# Patient Record
Sex: FEMALE | Race: BLACK OR AFRICAN AMERICAN | NOT HISPANIC OR LATINO | Employment: OTHER | URBAN - METROPOLITAN AREA
[De-identification: names, ages, dates, MRNs, and addresses within clinical notes are randomized per-mention and may not be internally consistent; named-entity substitution may affect disease eponyms.]

---

## 2022-05-26 ENCOUNTER — HOSPITAL ENCOUNTER (EMERGENCY)
Facility: HOSPITAL | Age: 42
Discharge: HOME | End: 2022-05-26
Attending: STUDENT IN AN ORGANIZED HEALTH CARE EDUCATION/TRAINING PROGRAM
Payer: MEDICAID

## 2022-05-26 VITALS
SYSTOLIC BLOOD PRESSURE: 104 MMHG | HEART RATE: 86 BPM | BODY MASS INDEX: 24.8 KG/M2 | DIASTOLIC BLOOD PRESSURE: 61 MMHG | RESPIRATION RATE: 20 BRPM | HEIGHT: 67 IN | WEIGHT: 158 LBS | OXYGEN SATURATION: 98 % | TEMPERATURE: 99 F

## 2022-05-26 DIAGNOSIS — E86.0 DEHYDRATION: ICD-10-CM

## 2022-05-26 DIAGNOSIS — R55 SYNCOPE: ICD-10-CM

## 2022-05-26 DIAGNOSIS — R40.20 LOC (LOSS OF CONSCIOUSNESS): Primary | ICD-10-CM

## 2022-05-26 LAB
ALBUMIN SERPL-MCNC: 4.1 GM/DL (ref 3.5–5)
ALBUMIN/GLOB SERPL: 1.4 RATIO (ref 1.1–2)
ALP SERPL-CCNC: 69 UNIT/L (ref 40–150)
ALT SERPL-CCNC: 14 UNIT/L (ref 0–55)
ANISOCYTOSIS BLD QL SMEAR: ABNORMAL
APPEARANCE UR: CLEAR
AST SERPL-CCNC: 19 UNIT/L (ref 5–34)
BACTERIA #/AREA URNS AUTO: NORMAL /HPF
BASOPHILS # BLD AUTO: 0.08 X10(3)/MCL (ref 0–0.2)
BASOPHILS NFR BLD AUTO: 0.8 %
BILIRUB UR QL STRIP.AUTO: NEGATIVE MG/DL
BILIRUBIN DIRECT+TOT PNL SERPL-MCNC: 0.5 MG/DL
BUN SERPL-MCNC: 12 MG/DL (ref 7–18.7)
CALCIUM SERPL-MCNC: 9.5 MG/DL (ref 8.4–10.2)
CHLORIDE SERPL-SCNC: 107 MMOL/L (ref 98–107)
CO2 SERPL-SCNC: 24 MMOL/L (ref 22–29)
COLOR UR AUTO: YELLOW
CREAT SERPL-MCNC: 0.97 MG/DL (ref 0.55–1.02)
EOSINOPHIL # BLD AUTO: 0.18 X10(3)/MCL (ref 0–0.9)
EOSINOPHIL NFR BLD AUTO: 1.8 %
ERYTHROCYTE [DISTWIDTH] IN BLOOD BY AUTOMATED COUNT: 19.5 % (ref 11.5–17)
GLOBULIN SER-MCNC: 3 GM/DL (ref 2.4–3.5)
GLUCOSE SERPL-MCNC: 108 MG/DL (ref 74–100)
GLUCOSE UR QL STRIP.AUTO: NEGATIVE MG/DL
HCT VFR BLD AUTO: 29.1 % (ref 37–47)
HGB BLD-MCNC: 8.4 GM/DL (ref 12–16)
IMM GRANULOCYTES # BLD AUTO: 0.07 X10(3)/MCL (ref 0–0.02)
IMM GRANULOCYTES NFR BLD AUTO: 0.7 % (ref 0–0.43)
KETONES UR QL STRIP.AUTO: ABNORMAL MG/DL
LEUKOCYTE ESTERASE UR QL STRIP.AUTO: NEGATIVE UNIT/L
LYMPHOCYTES # BLD AUTO: 2.11 X10(3)/MCL (ref 0.6–4.6)
LYMPHOCYTES NFR BLD AUTO: 20.6 %
MACROCYTES BLD QL SMEAR: ABNORMAL
MCH RBC QN AUTO: 23.7 PG (ref 27–31)
MCHC RBC AUTO-ENTMCNC: 28.9 MG/DL (ref 33–36)
MCV RBC AUTO: 82 FL (ref 80–94)
MONOCYTES # BLD AUTO: 0.77 X10(3)/MCL (ref 0.1–1.3)
MONOCYTES NFR BLD AUTO: 7.5 %
NEUTROPHILS # BLD AUTO: 7.1 X10(3)/MCL (ref 2.1–9.2)
NEUTROPHILS NFR BLD AUTO: 68.6 %
NITRITE UR QL STRIP.AUTO: NEGATIVE
NRBC BLD AUTO-RTO: 0 %
PH UR STRIP.AUTO: 5.5 [PH]
PLATELET # BLD AUTO: 531 X10(3)/MCL (ref 130–400)
PLATELET # BLD EST: ABNORMAL 10*3/UL
PMV BLD AUTO: 8.7 FL (ref 9.4–12.4)
POCT GLUCOSE: 121 MG/DL (ref 70–110)
POTASSIUM SERPL-SCNC: 4 MMOL/L (ref 3.5–5.1)
PROT SERPL-MCNC: 7.1 GM/DL (ref 6.4–8.3)
PROT UR QL STRIP.AUTO: NEGATIVE MG/DL
RBC # BLD AUTO: 3.55 X10(6)/MCL (ref 4.2–5.4)
RBC #/AREA URNS AUTO: <5 /HPF
RBC UR QL AUTO: NEGATIVE UNIT/L
SODIUM SERPL-SCNC: 139 MMOL/L (ref 136–145)
SP GR UR STRIP.AUTO: 1.01 (ref 1–1.03)
SQUAMOUS #/AREA URNS AUTO: <4 /LPF
TROPONIN I SERPL-MCNC: <0.01 NG/ML (ref 0–0.04)
UROBILINOGEN UR STRIP-ACNC: 0.2 MG/DL
WBC # SPEC AUTO: 10.3 X10(3)/MCL (ref 4.5–11.5)
WBC #/AREA URNS AUTO: <5 /HPF

## 2022-05-26 PROCEDURE — 81001 URINALYSIS AUTO W/SCOPE: CPT | Performed by: STUDENT IN AN ORGANIZED HEALTH CARE EDUCATION/TRAINING PROGRAM

## 2022-05-26 PROCEDURE — 63600175 PHARM REV CODE 636 W HCPCS: Performed by: STUDENT IN AN ORGANIZED HEALTH CARE EDUCATION/TRAINING PROGRAM

## 2022-05-26 PROCEDURE — 36415 COLL VENOUS BLD VENIPUNCTURE: CPT | Performed by: PHYSICIAN ASSISTANT

## 2022-05-26 PROCEDURE — 80053 COMPREHEN METABOLIC PANEL: CPT | Performed by: PHYSICIAN ASSISTANT

## 2022-05-26 PROCEDURE — 85025 COMPLETE CBC W/AUTO DIFF WBC: CPT | Performed by: PHYSICIAN ASSISTANT

## 2022-05-26 PROCEDURE — 93005 ELECTROCARDIOGRAM TRACING: CPT

## 2022-05-26 PROCEDURE — 99285 EMERGENCY DEPT VISIT HI MDM: CPT | Mod: 25

## 2022-05-26 PROCEDURE — 84484 ASSAY OF TROPONIN QUANT: CPT | Performed by: PHYSICIAN ASSISTANT

## 2022-05-26 PROCEDURE — 82962 GLUCOSE BLOOD TEST: CPT

## 2022-05-26 RX ADMIN — SODIUM CHLORIDE, POTASSIUM CHLORIDE, SODIUM LACTATE AND CALCIUM CHLORIDE 1000 ML: 600; 310; 30; 20 INJECTION, SOLUTION INTRAVENOUS at 04:05

## 2022-05-26 NOTE — ED TRIAGE NOTES
Patient identifiers verified and correct for Ms. Mai Diaz. Pt arrives to ED with complaints of syncopal episode this afternoon around 1230. Pt reports going to car to leave, which is the last thing pt remembers. Pt was found by sister in car turned on 10 mins later. Pt denies any symptoms at this time.   LOC: The patient is awake, alert and aware of environment with an appropriate affect, the patient is oriented x 4 and speaking appropriately.   APPEARANCE: Patient appears comfortable and in no acute distress, patient is clean and well groomed.  SKIN: The skin is warm and dry, color consistent with ethnicity, patient has normal skin turgor and moist mucus membranes, skin intact, no breakdown or bruising noted.   MUSCULOSKELETAL: Patient moving all extremities spontaneously, no swelling noted.  RESPIRATORY: Airway is open and patent, respirations are spontaneous, patient has a normal effort and rate, no accessory muscle use noted, pt placed on continuous pulse ox with O2 sats noted at 99% on room air.  CARDIAC: Pt placed on cardiac monitor. Patient has a normal rate and regular rhythm, no edema noted, capillary refill < 3 seconds.   GASTRO: Soft and non tender to palpation, no distention noted, normoactive bowel sounds present in all four quadrants. Pt states bowel movements have been regular.  : Pt denies any pain or frequency with urination.  NEURO: Pt opens eyes spontaneously, behavior appropriate to situation, follows commands, facial expression symmetrical, bilateral hand grasp equal and even, purposeful motor response noted, normal sensation in all extremities when touched with a finger.

## 2022-05-26 NOTE — ED PROVIDER NOTES
Encounter Date: 5/26/2022    SCRIBE #1 NOTE: I, Lan Hanson, am scribing for, and in the presence of,  Rajat Fowler. I have scribed the following portions of the note - Other sections scribed: HPI, ROS, PE, MDM.       History     Chief Complaint   Patient presents with    Loss of Consciousness     Reports LOC while in car; unknown length of LOC. Takes she took Ambien prior to incident (states she was stressed).     Vomiting     I, Rajat Fowler, assumed care at 1630    43 y/o F presents to ED after loss of consciousness around 1 pm today. The pt was reportedly found by her sister who states the pt told her she was leaving, but the sister noticed the car was still under the carport running for 15 minutes after pt told her she was leaving. The sister states she went to the car and found the pt hunched over, when the sister opened the car the pt still had no response. The sister also reports when the pt woke up she stood up out of the car and began to vomit, she then went layed on the couch and then went upstairs and went to sleep. The pt reports the last thing she remembers is walking to the car. Pt reports taking an ambien when she got in the car because she has recently been very stressed and anxious due to work; she also reports she has taken them before and this symptom has never occurred. She c/o being tired, but denies any HA or nausea. Pt does have history of epilepsy when she was younger with her last seizure being around 7 or 8 years old. Pt was also recently seen by a doctor who told her she was anemic which she reports is related to her diet change and weight loss, she has lost over 40 lbs in the last year.       Loss of Consciousness  This is a new problem. Episode onset: 1pm today. The problem has been resolved. Pertinent negatives include no chest pain, no abdominal pain, no headaches and no shortness of breath. Nothing aggravates the symptoms. Nothing relieves the symptoms. She has tried nothing for the  symptoms.     Review of patient's allergies indicates:   Allergen Reactions    Acetaminophen     Latex Other (See Comments)     History reviewed. No pertinent past medical history.  History reviewed. No pertinent surgical history.  History reviewed. No pertinent family history.     Review of Systems   Constitutional: Positive for fatigue. Negative for chills and fever.   HENT: Negative for congestion, rhinorrhea and sore throat.    Eyes: Negative for visual disturbance.   Respiratory: Negative for cough and shortness of breath.    Cardiovascular: Positive for syncope. Negative for chest pain and leg swelling.   Gastrointestinal: Positive for vomiting. Negative for abdominal pain and nausea.   Genitourinary: Negative for dysuria, hematuria, vaginal bleeding and vaginal discharge.   Musculoskeletal: Negative for joint swelling.   Skin: Negative for rash.   Neurological: Negative for weakness and headaches.        Loss of conciseness   Psychiatric/Behavioral: Negative for confusion.       Physical Exam     Initial Vitals [05/26/22 1412]   BP Pulse Resp Temp SpO2   115/72 78 20 98.6 °F (37 °C) 98 %      MAP       --         Physical Exam    Nursing note and vitals reviewed.  Constitutional: She is not diaphoretic. No distress.   HENT:   Head: Normocephalic and atraumatic.   Mouth/Throat: Mucous membranes are dry.   Eyes: EOM are normal. Pupils are equal, round, and reactive to light.   Neck: Neck supple.   Normal range of motion.  Cardiovascular: Normal rate and regular rhythm.   No murmur heard.  Pulmonary/Chest: Breath sounds normal. No respiratory distress. She has no wheezes. She has no rales.   Abdominal: Abdomen is soft. She exhibits no distension. There is no abdominal tenderness.   Musculoskeletal:         General: Normal range of motion.      Cervical back: Normal range of motion and neck supple.     Neurological: She is alert and oriented to person, place, and time. She has normal strength.   Skin: Skin is  warm. No rash noted.   Psychiatric: She has a normal mood and affect.         ED Course   Procedures  Labs Reviewed   COMPREHENSIVE METABOLIC PANEL - Abnormal; Notable for the following components:       Result Value    Glucose Level 108 (*)     All other components within normal limits   CBC WITH DIFFERENTIAL - Abnormal; Notable for the following components:    RBC 3.55 (*)     Hgb 8.4 (*)     Hct 29.1 (*)     MCH 23.7 (*)     MCHC 28.9 (*)     RDW 19.5 (*)     Platelet 531 (*)     MPV 8.7 (*)     IG# 0.07 (*)     IG% 0.7 (*)     All other components within normal limits   BLOOD SMEAR MICROSCOPIC EXAM (OLG) - Abnormal; Notable for the following components:    Anisocyte 1+ (*)     Macrocyte 1+ (*)     All other components within normal limits   URINALYSIS, REFLEX TO URINE CULTURE - Abnormal; Notable for the following components:    Ketones, UA 1+ (*)     All other components within normal limits   POCT GLUCOSE - Abnormal; Notable for the following components:    POCT Glucose 121 (*)     All other components within normal limits   TROPONIN I - Normal   URINALYSIS, MICROSCOPIC - Normal   CBC W/ AUTO DIFFERENTIAL    Narrative:     The following orders were created for panel order CBC auto differential.  Procedure                               Abnormality         Status                     ---------                               -----------         ------                     CBC with Differential[[id number]]        Abnormal            Final result                 Please view results for these tests on the individual orders.   PREGNANCY TEST, URINE RAPID   POCT GLUCOSE, HAND-HELD DEVICE     EKG Readings: (Independently Interpreted)   Initial Reading: No STEMI. Rhythm: Normal Sinus Rhythm. Heart Rate: 72. Ectopy: No Ectopy. Conduction: Normal. ST Segments: Normal ST Segments. T Waves: Normal. Axis: Normal.   Performed at 1419     ECG Results          EKG 12-lead (Final result)  Result time 05/26/22 15:13:46    Final result  by Interface, Lab In [facility] (05/26/22 15:13:46)                 Narrative:    Test Reason : R55,    Vent. Rate : 072 BPM     Atrial Rate : 072 BPM     P-R Int : 164 ms          QRS Dur : 088 ms      QT Int : 420 ms       P-R-T Axes : 074 076 057 degrees     QTc Int : 459 ms    Normal sinus rhythm with sinus arrhythmia  Normal ECG  Confirmed by Kemar Callejas MD ([id number]) on 5/26/2022 3:13:34 PM    Referred By:             Confirmed By:Kemar Callejas MD                            Imaging Results          CT Head Without Contrast (Final result)  Result time 05/26/22 17:24:42    Final result by Jorge L Lopez MD (05/26/22 17:24:42)                 Impression:      No acute intracranial findings identified.      Electronically signed by: Jorge L Lopez  Date:    05/26/2022  Time:    17:24             Narrative:    EXAMINATION:  CT HEAD WITHOUT CONTRAST    CLINICAL HISTORY:  Syncope, simple, normal neuro exam;    TECHNIQUE:  Sequential axial images were performed of the brain without contrast.    Dose product length of 1273 mGycm. Automated exposure control was utilized to minimize radiation dose.    COMPARISON:  None available.    FINDINGS:  There is no intracranial mass effect, midline shift, hydrocephalus or hemorrhage. There is no sulcal effacement or low attenuation changes to suggest recent large vessel territory infarction.  There is no acute extra axial fluid collection. Visualized paranasal sinuses are clear without mucosal thickening, polypoidal abnormality or air-fluid levels. Mastoid air cells aeration is optimal.                                 Medications   lactated ringers bolus 1,000 mL (has no administration in time range)   lactated ringers bolus 1,000 mL (1,000 mLs Intravenous New Bag 5/26/22 1641)     Medical Decision Making:   History:   Old Medical Records: I decided to obtain old medical records.  Initial Assessment:   41 yo with pMHx anemia, childhood epilepsy presenting for LOC after taking an  ambien earlier today. Reports she has been very stressed. Took ambien then found by sister in car - not in enclosed area. No witnessed seizure activity. On my exam patient is dehydrated, otherwise [id number]. Will obtain labs, CT head, orthostatics, UA, hydrate, reassess.   Differential Diagnosis:   Seziure, syncope, intracranial mass/hemorrhage, dehydration, infection   Independently Interpreted Test(s):   I have ordered and independently interpreted EKG Reading(s) - see prior notes  Clinical Tests:   Lab Tests: Ordered and Reviewed  Radiological Study: Ordered and Reviewed  Medical Tests: Ordered and Reviewed  ED Management:  IVF  observation          Scribe Attestation:   Scribe #1: I performed the above scribed service and the documentation accurately describes the services I performed. I attest to the accuracy of the note.    Attending Attestation:           Physician Attestation for Scribe:  Physician Attestation Statement for Scribe #1: I, Lan Hanson, reviewed documentation, as scribed by Rajat Fowler in my presence, and it is both accurate and complete.             ED Course as of 05/26/22 2114   Thu May 26, 2022   1918 No further events while in ED. Hemoglobin baseline. Patient is ready to go. CT head unremarkable. WIll discharge, return precuations given.  [AC]   1920 I have reassessed the patient.  Patient is resting comfortably, no acute distress.  Vital signs stable.  Discussed all results including incidental findings.  Discussed need for follow up and discussed return precautions.  Answered all questions at this time.  Hemodynamically stable for continued outpatient management. Patient verbalized understanding and agreed to plan.     [AC]      ED Course User Index  [AC] Rajat Fowler IV, MD             Clinical Impression:   Final diagnoses:  [R55] Syncope  [R40.20] LOC (loss of consciousness) (Primary)  [E86.0] Dehydration          ED Disposition Condition    Discharge Stable       IRajat MD  personally performed the history, PE, MDM, and procedures as documented above and agree with the scribe's documentation.     ED Prescriptions     None        Follow-up Information     Follow up With Specialties Details Why Contact Info    Melissa Hyde MD Internal Medicine Schedule an appointment as soon as possible for a visit   [address] [place] [id number]  [phone]      [facility] - Emergency Dept Emergency Medicine Go to  If symptoms worsen [facility] [facility] [id number] [phone]           Rajat Fowler IV, MD  05/26/22 [id number]

## 2022-05-26 NOTE — FIRST PROVIDER EVALUATION
"Medical screening exam completed.  I have conducted a focused provider triage encounter, findings are as follows:    Brief history of present illness:  42-year-old female presents to ED for evaluation after syncopal episode.  Patient reports that she walked to her car getting in and passed out sitting in her car.  Patient also states that she took an Ambien 30-45 minutes prior to passing out.  Says she has been under lots of stress.    Vitals:    05/26/22 1412   BP: 115/72   BP Location: Left arm   Patient Position: Sitting   Pulse: 78   Resp: 20   Temp: 98.6 °F (37 °C)   TempSrc: Oral   SpO2: 98%   Weight: 71.7 kg (158 lb)   Height: 5' 7" (1.702 m)       Pertinent physical exam:  Patient appears tired and dry heaving.    Brief workup plan:  Will get basic labs, EKG, IV fluids, POC glucose.  Preliminary workup initiated; this workup will be continued and followed by the physician or advanced practice provider that is assigned to the patient when roomed.  "